# Patient Record
Sex: FEMALE | Race: BLACK OR AFRICAN AMERICAN | URBAN - METROPOLITAN AREA
[De-identification: names, ages, dates, MRNs, and addresses within clinical notes are randomized per-mention and may not be internally consistent; named-entity substitution may affect disease eponyms.]

---

## 2022-11-07 ENCOUNTER — APPOINTMENT (RX ONLY)
Dept: URBAN - METROPOLITAN AREA CLINIC 354 | Facility: CLINIC | Age: 47
Setting detail: DERMATOLOGY
End: 2022-11-07

## 2022-11-07 DIAGNOSIS — L21.8 OTHER SEBORRHEIC DERMATITIS: ICD-10-CM | Status: INADEQUATELY CONTROLLED

## 2022-11-07 DIAGNOSIS — D17 BENIGN LIPOMATOUS NEOPLASM: ICD-10-CM | Status: STABLE

## 2022-11-07 PROBLEM — D48.5 NEOPLASM OF UNCERTAIN BEHAVIOR OF SKIN: Status: ACTIVE | Noted: 2022-11-07

## 2022-11-07 PROCEDURE — ? COUNSELING

## 2022-11-07 PROCEDURE — ? DEFER

## 2022-11-07 PROCEDURE — ? PRESCRIPTION MEDICATION MANAGEMENT

## 2022-11-07 PROCEDURE — 99204 OFFICE O/P NEW MOD 45 MIN: CPT

## 2022-11-07 PROCEDURE — ? PATIENT SPECIFIC COUNSELING

## 2022-11-07 PROCEDURE — ? PRESCRIPTION

## 2022-11-07 RX ORDER — FLUOCINOLONE ACETONIDE 0.11 MG/ML
OIL TOPICAL
Qty: 118.28 | Refills: 0 | Status: ERX | COMMUNITY
Start: 2022-11-07

## 2022-11-07 RX ORDER — CICLOPIROX 10 MG/.96ML
SHAMPOO TOPICAL
Qty: 120 | Refills: 3 | Status: ERX | COMMUNITY
Start: 2022-11-07

## 2022-11-07 RX ORDER — EMOLLIENT COMBINATION NO.43
CREAM (GRAM) TOPICAL
Qty: 30 | Refills: 3 | Status: ERX | COMMUNITY
Start: 2022-11-07

## 2022-11-07 RX ADMIN — CICLOPIROX: 10 SHAMPOO TOPICAL at 00:00

## 2022-11-07 RX ADMIN — Medication: at 00:00

## 2022-11-07 RX ADMIN — FLUOCINOLONE ACETONIDE: 0.11 OIL TOPICAL at 00:00

## 2022-11-07 ASSESSMENT — LOCATION SIMPLE DESCRIPTION DERM
LOCATION SIMPLE: SUPERIOR FOREHEAD
LOCATION SIMPLE: LEFT CHEEK
LOCATION SIMPLE: UPPER BACK
LOCATION SIMPLE: RIGHT CHEEK
LOCATION SIMPLE: LEFT SCALP
LOCATION SIMPLE: POSTERIOR SCALP

## 2022-11-07 ASSESSMENT — LOCATION DETAILED DESCRIPTION DERM
LOCATION DETAILED: LEFT INFERIOR POSTAURICULAR SKIN
LOCATION DETAILED: LEFT INFERIOR CENTRAL MALAR CHEEK
LOCATION DETAILED: SUPERIOR THORACIC SPINE
LOCATION DETAILED: LEFT MEDIAL FRONTAL SCALP
LOCATION DETAILED: SUPERIOR MID FOREHEAD
LOCATION DETAILED: RIGHT INFERIOR CENTRAL MALAR CHEEK

## 2022-11-07 ASSESSMENT — LOCATION ZONE DERM
LOCATION ZONE: FACE
LOCATION ZONE: SCALP
LOCATION ZONE: TRUNK

## 2022-11-07 NOTE — PROCEDURE: PATIENT SPECIFIC COUNSELING
Discussed seeing plastic surgeon if change in lesion or getting imaging done.  Excision safest option. Patient wishes removal and has been told in past lesion is a lipoma and benign.  I discussed only certainty can be achieved with excision or imaging on lesion.
Detail Level: Detailed

## 2022-11-07 NOTE — HPI: DRY SKIN
Is This A New Presentation Or A Follow-Up?: Dry Skin
Additional History: Previously saw a dermatologist years ago, and it was diagnosed as seb derm. Pt was using cortisone 2.5, and would like a refill. Pt is open to other treatment plans.

## 2022-11-07 NOTE — PROCEDURE: PRESCRIPTION MEDICATION MANAGEMENT
Render In Strict Bullet Format?: No
Detail Level: Simple
Plan: Limited examination as patient wearing make up.  Explained best ot examine people without makeup.  Perioraficial dermatitis in the ddx. \\nHowever, scaling in crown of scalp in addition lends a diagnosis of seb derm.  Discussed topical steriods with risks reviewed including glaucoma and cataract with use on the face. Discussed Promiseb which will be tried as a steroid sparing agent. Risks and benefits reviewed. Discussed ketoconazole shampoo. Discussed ciclopirox shampoo to  use weekly with gentle shampoos if wishes alternative cleanser

## 2022-11-07 NOTE — PROCEDURE: COUNSELING
Detail Level: Detailed
Patient Specific Counseling (Will Not Stick From Patient To Patient): Patient wishes to avoid TS on the face but will use on scalp. Explained dermasmoothe will be used as a scalp oil not under occlusion to be washed out in am   To start with more consistent application to scalp and if improves to limit use to sparing use prn